# Patient Record
Sex: FEMALE | Race: WHITE
[De-identification: names, ages, dates, MRNs, and addresses within clinical notes are randomized per-mention and may not be internally consistent; named-entity substitution may affect disease eponyms.]

---

## 2020-01-29 ENCOUNTER — TRANSCRIBE ORDERS (OUTPATIENT)
Dept: OTHER | Age: 35
End: 2020-01-29

## 2020-01-29 DIAGNOSIS — G89.29 CHRONIC HEADACHE: Primary | ICD-10-CM

## 2020-01-29 DIAGNOSIS — R51.9 CHRONIC HEADACHE: Primary | ICD-10-CM

## 2020-01-30 ENCOUNTER — PRE VISIT (OUTPATIENT)
Dept: NEUROLOGY | Facility: CLINIC | Age: 35
End: 2020-01-30

## 2020-01-30 NOTE — TELEPHONE ENCOUNTER
FUTURE VISIT INFORMATION      FUTURE VISIT INFORMATION:    Date: 3/12/2020    Time: 10AM    Location: [facility] [place]  REFERRAL INFORMATION:    Referring provider:  Self     Referring providers clinic:      Reason for visit/diagnosis  Headaches     RECORDS REQUESTED FROM:       Clinic name Comments Records Status Imaging Status   [facility]  1/24/2020, 1/5/2020, 12/30/2019, 6/12/2019, 6/11/2019,5/28/2019 Care Everywhere Requested MR Brain 7/2/2019 to PACS    [facility] 6/21/2019, 5/13/2019 Care Everywhere   Requested-CT Head 6/21/2019 and 5/13/2019 to be mailed                              Action 2.4.2020 7:09 AM MJ   Action Taken Received CD from [facility]. Placed in [employer] inbasket to be uploaded into pt's chart.     Action 3.16.2020 MJ 10:27 AM   Action Taken 6.21.19 and 5.13.19 images in PACS. Spoke with [facility] and requested 7.2.19 to be pushed.

## 2020-04-07 ENCOUNTER — VIRTUAL VISIT (OUTPATIENT)
Dept: NEUROLOGY | Facility: CLINIC | Age: 35
End: 2020-04-07
Attending: FAMILY MEDICINE
Payer: COMMERCIAL

## 2020-04-07 DIAGNOSIS — R69 DIAGNOSIS DEFERRED: Primary | ICD-10-CM

## 2021-05-31 ENCOUNTER — RECORDS - HEALTHEAST (OUTPATIENT)
Dept: ADMINISTRATIVE | Facility: CLINIC | Age: 36
End: 2021-05-31